# Patient Record
(demographics unavailable — no encounter records)

---

## 2024-10-30 NOTE — PHYSICAL EXAM
[No Acute Distress] : no acute distress [Well Nourished] : well nourished [Well Developed] : well developed [Well-Appearing] : well-appearing [Normal Sclera/Conjunctiva] : normal sclera/conjunctiva [Normal Outer Ear/Nose] : the outer ears and nose were normal in appearance [No Respiratory Distress] : no respiratory distress  [Normal Rate] : normal rate  [No Murmur] : no murmur heard [Non-distended] : non-distended [Normal Posterior Cervical Nodes] : no posterior cervical lymphadenopathy [Alert and Oriented x3] : oriented to person, place, and time

## 2024-10-30 NOTE — HEALTH RISK ASSESSMENT
[Yes] : Yes [Monthly or less (1 pt)] : Monthly or less (1 point) [1 or 2 (0 pts)] : 1 or 2 (0 points) [Never (0 pts)] : Never (0 points) [No] : In the past 12 months have you used drugs other than those required for medical reasons? No [No falls in past year] : Patient reported no falls in the past year [0] : 2) Feeling down, depressed, or hopeless: Not at all (0) [de-identified] : No [de-identified] : No [Audit-CScore] : 1 [de-identified] : Pt Three times a week  [de-identified] : Regular  [de-identified] : No
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
## 2025-06-24 NOTE — PHYSICAL EXAM
[Normal Sclera/Conjunctiva] : normal sclera/conjunctiva [Normal Outer Ear/Nose] : the outer ears and nose were normal in appearance [Normal] : normal rate, regular rhythm, normal S1 and S2 and no murmur heard [No Focal Deficits] : no focal deficits [Alert and Oriented x3] : oriented to person, place, and time

## 2025-06-24 NOTE — HISTORY OF PRESENT ILLNESS
[FreeTextEntry1] : weight mgmt; BMI 34 [de-identified] : The patient is a 46-year-old F who presents to the office for the following concerns: #Weight mgmt -BMI 34. Difficulty losing weight. Seeking additional options/assistance with weight loss  #Recent colonoscopy showed mild Crohn's disease

## 2025-06-24 NOTE — HEALTH RISK ASSESSMENT
[Yes] : Yes [Monthly or less (1 pt)] : Monthly or less (1 point) [1 or 2 (0 pts)] : 1 or 2 (0 points) [Never (0 pts)] : Never (0 points) [No] : In the past 12 months have you used drugs other than those required for medical reasons? No [No falls in past year] : Patient reported no falls in the past year [0] : 2) Feeling down, depressed, or hopeless: Not at all (0) [de-identified] : No [de-identified] : REKHA Kyle  [Audit-CScore] : 1 [de-identified] : Little moseskas during the day  [de-identified] : Regular  [de-identified] : No

## 2025-06-24 NOTE — HISTORY OF PRESENT ILLNESS
[FreeTextEntry1] : weight mgmt; BMI 34 [de-identified] : The patient is a 46-year-old F who presents to the office for the following concerns: #Weight mgmt -BMI 34. Difficulty losing weight. Seeking additional options/assistance with weight loss  #Recent colonoscopy showed mild Crohn's disease

## 2025-06-24 NOTE — HEALTH RISK ASSESSMENT
[Yes] : Yes [Monthly or less (1 pt)] : Monthly or less (1 point) [1 or 2 (0 pts)] : 1 or 2 (0 points) [Never (0 pts)] : Never (0 points) [No] : In the past 12 months have you used drugs other than those required for medical reasons? No [No falls in past year] : Patient reported no falls in the past year [0] : 2) Feeling down, depressed, or hopeless: Not at all (0) [de-identified] : No [de-identified] : REKHA Kyle  [Audit-CScore] : 1 [de-identified] : Little moseskas during the day  [de-identified] : Regular  [de-identified] : No

## 2025-07-22 NOTE — HEALTH RISK ASSESSMENT
[Yes] : Yes [Monthly or less (1 pt)] : Monthly or less (1 point) [1 or 2 (0 pts)] : 1 or 2 (0 points) [Never (0 pts)] : Never (0 points) [No] : In the past 12 months have you used drugs other than those required for medical reasons? No [No falls in past year] : Patient reported no falls in the past year [0] : 2) Feeling down, depressed, or hopeless: Not at all (0) [de-identified] : No [de-identified] : No [de-identified] : Active with daily with daily activities  [de-identified] : Regular

## 2025-07-22 NOTE — PHYSICAL EXAM
[No Acute Distress] : no acute distress [No Respiratory Distress] : no respiratory distress  [Normal Rate] : normal rate  [No Focal Deficits] : no focal deficits [Alert and Oriented x3] : oriented to person, place, and time [de-identified] : hyperpigmented macule under chin

## 2025-07-22 NOTE — HISTORY OF PRESENT ILLNESS
[FreeTextEntry1] : weight mgmt  [de-identified] : The patient is a 46-year-old F who presents to the office for routine follow up of chronic conditions.  #Weight mgmt - currently on Zepbound 2.5mg. Pt has instead gained weight on medication. Needs dose increase, however patient unable to afford medication  #Ergonomic chair and stool to elevate legs - requests letter  #Vaginal irritation - vaginal swab negative. Says she sweats often in vaginal region. No abnormal discharge, only itching